# Patient Record
Sex: MALE | URBAN - METROPOLITAN AREA
[De-identification: names, ages, dates, MRNs, and addresses within clinical notes are randomized per-mention and may not be internally consistent; named-entity substitution may affect disease eponyms.]

---
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
## 2021-05-10 ENCOUNTER — TRANSCRIPTION ENCOUNTER (OUTPATIENT)
Age: 21
End: 2021-05-10

## 2022-06-27 ENCOUNTER — APPOINTMENT (OUTPATIENT)
Dept: INTERNAL MEDICINE | Facility: CLINIC | Age: 22
End: 2022-06-27
Payer: COMMERCIAL

## 2022-06-27 VITALS
BODY MASS INDEX: 23.8 KG/M2 | DIASTOLIC BLOOD PRESSURE: 70 MMHG | HEIGHT: 71 IN | SYSTOLIC BLOOD PRESSURE: 110 MMHG | WEIGHT: 170 LBS

## 2022-06-27 DIAGNOSIS — Z78.9 OTHER SPECIFIED HEALTH STATUS: ICD-10-CM

## 2022-06-27 DIAGNOSIS — J32.9 CHRONIC SINUSITIS, UNSPECIFIED: ICD-10-CM

## 2022-06-27 DIAGNOSIS — S70.10XA CONTUSION OF UNSPECIFIED THIGH, INITIAL ENCOUNTER: ICD-10-CM

## 2022-06-27 PROCEDURE — 99203 OFFICE O/P NEW LOW 30 MIN: CPT

## 2022-06-27 RX ORDER — CEFDINIR 300 MG/1
300 CAPSULE ORAL
Qty: 20 | Refills: 0 | Status: ACTIVE | COMMUNITY
Start: 2022-06-27 | End: 1900-01-01

## 2022-06-27 NOTE — ASSESSMENT
[FreeTextEntry1] : Thigh contusion/hematoma–it is actually improved over the past few days since the injury.  He was told the area may remain tender for a number of days.  Also warned that the ecchymosis can start to extend down his thigh.  There is no evidence of infection at present and he can use Advil/Tylenol as needed for pain along with moist heat.  He will follow-up if there is any significant change or worsening.\par \par Sinusitis–his symptoms have been persistent and progressive over the past 2 weeks.  He now has discolored mucus.\par He will start Omnicef 600 mg daily for the next 10 days.

## 2022-06-27 NOTE — PHYSICAL EXAM
[No Acute Distress] : no acute distress [No Lymphadenopathy] : no lymphadenopathy [No Respiratory Distress] : no respiratory distress  [Clear to Auscultation] : lungs were clear to auscultation bilaterally [Normal Rate] : normal rate  [Regular Rhythm] : with a regular rhythm [No Murmur] : no murmur heard [No CVA Tenderness] : no CVA  tenderness [No Spinal Tenderness] : no spinal tenderness [de-identified] : Right posterior upper thigh/buttocks area with large area of ecchymosis.  There is a central area of subcutaneous firmness that is tender to palpation.  There is no drainage or fluctuance.

## 2022-06-27 NOTE — HISTORY OF PRESENT ILLNESS
[FreeTextEntry8] : 22 y/o male is in the office to establish care complaining of right upper posterior thigh/lower buttocks pain after falling between a train platform and the train car 3 days ago.  He initially had a large swelling at the area of contact but that has since improved.  The area still remains very tender with a local swelling/firmness.  There is a lot of surrounding ecchymosis.  There is no pain down his leg or numbness or weakness.\par Separately also complains of sinus pain and pressure that has been persistent and worsening over the past 2 weeks.  Initially started like allergies with clear mucus but it has now turned discolored.  No fever or chills.

## 2022-06-27 NOTE — REVIEW OF SYSTEMS
[Postnasal Drip] : postnasal drip [Nasal Discharge] : nasal discharge [Muscle Pain] : muscle pain [Fever] : no fever [Chills] : no chills [Earache] : no earache [Sore Throat] : no sore throat [Chest Pain] : no chest pain [Shortness Of Breath] : no shortness of breath [Cough] : no cough [Abdominal Pain] : no abdominal pain [Muscle Weakness] : no muscle weakness [Back Pain] : no back pain